# Patient Record
Sex: FEMALE | Race: OTHER | Employment: UNEMPLOYED | URBAN - METROPOLITAN AREA
[De-identification: names, ages, dates, MRNs, and addresses within clinical notes are randomized per-mention and may not be internally consistent; named-entity substitution may affect disease eponyms.]

---

## 2019-08-26 ENCOUNTER — HOSPITAL ENCOUNTER (EMERGENCY)
Age: 29
Discharge: HOME | End: 2019-08-26

## 2019-08-26 VITALS
RESPIRATION RATE: 18 BRPM | TEMPERATURE: 98.2 F | HEIGHT: 61 IN | BODY MASS INDEX: 32.05 KG/M2 | OXYGEN SATURATION: 98 % | DIASTOLIC BLOOD PRESSURE: 93 MMHG | SYSTOLIC BLOOD PRESSURE: 122 MMHG | HEART RATE: 80 BPM | WEIGHT: 169.75 LBS

## 2019-08-26 DIAGNOSIS — L28.2 PRURITIC RASH: Primary | ICD-10-CM

## 2019-08-26 PROCEDURE — 99282 EMERGENCY DEPT VISIT SF MDM: CPT

## 2019-08-26 RX ORDER — PERMETHRIN 50 MG/G
CREAM TOPICAL
Qty: 2 TUBE | Refills: 0 | Status: SHIPPED | OUTPATIENT
Start: 2019-08-26

## 2019-08-26 ASSESSMENT — PAIN DESCRIPTION - PAIN TYPE: TYPE: ACUTE PAIN

## 2019-08-26 ASSESSMENT — PAIN DESCRIPTION - DESCRIPTORS: DESCRIPTORS: ITCHING

## 2019-08-26 NOTE — ED TRIAGE NOTES
Pt presents to the ED with complaint of generalized rash/itching starting yesterday. Pt states that she has not come in contact with anything new that she is aware.

## 2019-08-26 NOTE — ED PROVIDER NOTES
Days per week: None     Minutes per session: None    Stress: None   Relationships    Social connections:     Talks on phone: None     Gets together: None     Attends [employer] service: None     Active member of club or organization: None     Attends meetings of clubs or organizations: None     Relationship status: None    Intimate partner violence:     Fear of current or ex partner: None     Emotionally abused: None     Physically abused: None     Forced sexual activity: None   Other Topics Concern    None   Social History Narrative    None     History reviewed. No pertinent family history. PHYSICAL EXAM    VITAL SIGNS: BP (!) 122/93   Pulse 80   Temp 98.2 °F (36.8 °C) (Oral)   Resp 18   Ht 5' 1.02\" (1.55 m)   Wt 169 lb 12.1 oz (77 kg)   SpO2 98%   BMI 32.05 kg/m²   Constitutional:  Well developed, well nourished  HENT:  Atraumatic, no facial or lip swelling  ORAL EXAM:   No tongue swelling, airway patent/Throat is clear  Neck/Lymphatics: supple, no JVD, no swollen nodes  Respiratory:  Nonlabored breathing. Lungs clear. Cardiovascular:  No JVD   Musculoskeletal:  No edema, no acute deformities. Integument:  Erythematous papular rash to extremities, torso. No oozing vesicles. No petechiae, pustules, purpura, or induration. ED COURSE & MEDICAL DECISION MAKING      Patient presents as above. Patient is Amharic-speaking and  iPad is used. Patient is well-appearing. Rash does not appear urticarial.  Rash does appear to be more like scabies or possible insect bites. Patient will be provided permethrin cream.  Recommend continuing Benadryl as it seems to be helping with the itchiness. Recommend follow-up with primary care in 3 to 5 days for recheck. Clinical  IMPRESSION    Pruritic rash      Diagnosis and plan discussed in detail with patient who understands and agrees.   Return to emergency Department warning signs discussed in detail with patient today who understands and